# Patient Record
Sex: MALE | Race: BLACK OR AFRICAN AMERICAN | NOT HISPANIC OR LATINO | Employment: FULL TIME | URBAN - METROPOLITAN AREA
[De-identification: names, ages, dates, MRNs, and addresses within clinical notes are randomized per-mention and may not be internally consistent; named-entity substitution may affect disease eponyms.]

---

## 2023-04-12 ENCOUNTER — OFFICE VISIT (OUTPATIENT)
Dept: PRIMARY CARE | Facility: CLINIC | Age: 58
End: 2023-04-12
Payer: COMMERCIAL

## 2023-04-12 VITALS
OXYGEN SATURATION: 95 % | RESPIRATION RATE: 16 BRPM | BODY MASS INDEX: 31.83 KG/M2 | WEIGHT: 256 LBS | HEIGHT: 75 IN | DIASTOLIC BLOOD PRESSURE: 82 MMHG | SYSTOLIC BLOOD PRESSURE: 121 MMHG | HEART RATE: 86 BPM

## 2023-04-12 DIAGNOSIS — Z00.00 HEALTH MAINTENANCE EXAMINATION: Primary | ICD-10-CM

## 2023-04-12 LAB
ERYTHROCYTE DISTRIBUTION WIDTH (RATIO) BY AUTOMATED COUNT: 13.2 % (ref 11.5–14.5)
ERYTHROCYTE MEAN CORPUSCULAR HEMOGLOBIN CONCENTRATION (G/DL) BY AUTOMATED: 30 G/DL (ref 32–36)
ERYTHROCYTE MEAN CORPUSCULAR VOLUME (FL) BY AUTOMATED COUNT: 79 FL (ref 80–100)
ERYTHROCYTES (10*6/UL) IN BLOOD BY AUTOMATED COUNT: 4.93 X10E12/L (ref 4.5–5.9)
HEMATOCRIT (%) IN BLOOD BY AUTOMATED COUNT: 39 % (ref 41–52)
HEMOGLOBIN (G/DL) IN BLOOD: 11.7 G/DL (ref 13.5–17.5)
LEUKOCYTES (10*3/UL) IN BLOOD BY AUTOMATED COUNT: 5.8 X10E9/L (ref 4.4–11.3)
NRBC (PER 100 WBCS) BY AUTOMATED COUNT: 0 /100 WBC (ref 0–0)
PLATELETS (10*3/UL) IN BLOOD AUTOMATED COUNT: 278 X10E9/L (ref 150–450)

## 2023-04-12 PROCEDURE — 1036F TOBACCO NON-USER: CPT | Performed by: FAMILY MEDICINE

## 2023-04-12 PROCEDURE — 80053 COMPREHEN METABOLIC PANEL: CPT

## 2023-04-12 PROCEDURE — 80061 LIPID PANEL: CPT

## 2023-04-12 PROCEDURE — 85027 COMPLETE CBC AUTOMATED: CPT

## 2023-04-12 PROCEDURE — 99396 PREV VISIT EST AGE 40-64: CPT | Performed by: FAMILY MEDICINE

## 2023-04-12 PROCEDURE — 36415 COLL VENOUS BLD VENIPUNCTURE: CPT | Performed by: FAMILY MEDICINE

## 2023-04-12 ASSESSMENT — PAIN SCALES - GENERAL: PAINLEVEL: 0-NO PAIN

## 2023-04-12 ASSESSMENT — ENCOUNTER SYMPTOMS
HEADACHES: 0
UNEXPECTED WEIGHT CHANGE: 0
PALPITATIONS: 0
ABDOMINAL PAIN: 0
ARTHRALGIAS: 1
SHORTNESS OF BREATH: 0

## 2023-04-12 ASSESSMENT — COLUMBIA-SUICIDE SEVERITY RATING SCALE - C-SSRS
2. HAVE YOU ACTUALLY HAD ANY THOUGHTS OF KILLING YOURSELF?: NO
1. IN THE PAST MONTH, HAVE YOU WISHED YOU WERE DEAD OR WISHED YOU COULD GO TO SLEEP AND NOT WAKE UP?: NO
6. HAVE YOU EVER DONE ANYTHING, STARTED TO DO ANYTHING, OR PREPARED TO DO ANYTHING TO END YOUR LIFE?: NO

## 2023-04-12 ASSESSMENT — PATIENT HEALTH QUESTIONNAIRE - PHQ9
1. LITTLE INTEREST OR PLEASURE IN DOING THINGS: NOT AT ALL
SUM OF ALL RESPONSES TO PHQ9 QUESTIONS 1 AND 2: 0
2. FEELING DOWN, DEPRESSED OR HOPELESS: NOT AT ALL

## 2023-04-12 NOTE — ASSESSMENT & PLAN NOTE
Check labs to assess for any contraindications to surgery  Overall, based on current exam the patient is a good candidate for surgery  Agree with continuing to lose some weight to help with postsurgical healing and overall care   Will touch base regarding results once available    Addendum:   Labs reviewed  Mild anemia, recommend iron supplement, colonoscopy if not up to date  Patient is cleared for surgery

## 2023-04-12 NOTE — LETTER
"April 13, 2023     Jak Garcia  [address] [facility] [address]    Patient: Jak Garcia   YOB: 1965   Date of Visit: 4/12/2023       Dear Dr. Jak Garcia:    Thank you for referring Jak Garcia to me for evaluation. Below are my notes for this consultation.  If you have questions, please do not hesitate to call me. I look forward to following your patient along with you.       Sincerely,     Daina Hatfield MD      CC: No Recipients  ______________________________________________________________________________________    Subjective  Patient ID: Jak Garcia is a 58 y.o. male who presents for Pre-op Exam (Left knee replacement ..).    HPI   Having left knee replacement in June of this year.  Has had issues with knee for awhile and has had arthroscopic surgery in the past.      Has not seen PCP in recent past.  No previous medical history.  Nonsmoker, not a drinker.  Trying to lose weight and strength train at the gym.    Has had general anesthesia several times in the past without any issue.  Able to walk flight of stairs without any problem.  No pulmonary problems.  No personal or family hx of blood clots or clotting disorder.      Review of Systems   Constitutional:  Negative for unexpected weight change.   Eyes:  Negative for visual disturbance.   Respiratory:  Negative for shortness of breath.    Cardiovascular:  Negative for chest pain, palpitations and leg swelling.   Gastrointestinal:  Negative for abdominal pain.   Musculoskeletal:  Positive for arthralgias.   Neurological:  Negative for headaches.       Objective  /82 (BP Location: Left arm, Patient Position: Sitting, BP Cuff Size: Large adult)   Pulse 86   Resp 16   Ht 1.905 m (6' 3\")   Wt 116 kg (256 lb)   SpO2 95%   BMI 32.00 kg/m²     Physical Exam  Constitutional:       Appearance: He is normal weight.   Eyes:      Extraocular Movements: Extraocular movements intact.      Pupils: Pupils are equal, round, and reactive to light. "   Cardiovascular:      Rate and Rhythm: Normal rate and regular rhythm.      Heart sounds: No murmur heard.     No gallop.   Pulmonary:      Effort: Pulmonary effort is normal.      Breath sounds: Normal breath sounds.   Musculoskeletal:      Cervical back: Normal range of motion.   Neurological:      General: No focal deficit present.      Mental Status: He is alert and oriented to person, place, and time.         Assessment/Plan  Problem List Items Addressed This Visit          Other    Health maintenance examination - Primary     Check labs to assess for any contraindications to surgery  Overall, based on current exam the patient is a good candidate for surgery  Agree with continuing to lose some weight to help with postsurgical healing and overall care   Will touch base regarding results once available         Relevant Orders    CBC    Comprehensive Metabolic Panel    Lipid Panel

## 2023-04-12 NOTE — PROGRESS NOTES
"Subjective   Patient ID: Jak Garcia is a 58 y.o. male who presents for Pre-op Exam (Left knee replacement ..).    HPI   Having left knee replacement in June of this year.  Has had issues with knee for awhile and has had arthroscopic surgery in the past.      Has not seen PCP in recent past.  No previous medical history.  Nonsmoker, not a drinker.  Trying to lose weight and strength train at the gym.    Has had general anesthesia several times in the past without any issue.  Able to walk flight of stairs without any problem.  No pulmonary problems.  No personal or family hx of blood clots or clotting disorder.      Review of Systems   Constitutional:  Negative for unexpected weight change.   Eyes:  Negative for visual disturbance.   Respiratory:  Negative for shortness of breath.    Cardiovascular:  Negative for chest pain, palpitations and leg swelling.   Gastrointestinal:  Negative for abdominal pain.   Musculoskeletal:  Positive for arthralgias.   Neurological:  Negative for headaches.       Objective   /82 (BP Location: Left arm, Patient Position: Sitting, BP Cuff Size: Large adult)   Pulse 86   Resp 16   Ht 1.905 m (6' 3\")   Wt 116 kg (256 lb)   SpO2 95%   BMI 32.00 kg/m²     Physical Exam  Constitutional:       Appearance: He is normal weight.   Eyes:      Extraocular Movements: Extraocular movements intact.      Pupils: Pupils are equal, round, and reactive to light.   Cardiovascular:      Rate and Rhythm: Normal rate and regular rhythm.      Heart sounds: No murmur heard.     No gallop.   Pulmonary:      Effort: Pulmonary effort is normal.      Breath sounds: Normal breath sounds.   Musculoskeletal:      Cervical back: Normal range of motion.   Neurological:      General: No focal deficit present.      Mental Status: He is alert and oriented to person, place, and time.         Assessment/Plan   Problem List Items Addressed This Visit          Other    Health maintenance examination - Primary     " Check labs to assess for any contraindications to surgery  Overall, based on current exam the patient is a good candidate for surgery  Agree with continuing to lose some weight to help with postsurgical healing and overall care   Will touch base regarding results once available         Relevant Orders    CBC    Comprehensive Metabolic Panel    Lipid Panel

## 2023-04-13 LAB
ALANINE AMINOTRANSFERASE (SGPT) (U/L) IN SER/PLAS: 20 U/L (ref 10–52)
ALBUMIN (G/DL) IN SER/PLAS: 4.5 G/DL (ref 3.4–5)
ALKALINE PHOSPHATASE (U/L) IN SER/PLAS: 72 U/L (ref 33–120)
ANION GAP IN SER/PLAS: 12 MMOL/L (ref 10–20)
ASPARTATE AMINOTRANSFERASE (SGOT) (U/L) IN SER/PLAS: 21 U/L (ref 9–39)
BILIRUBIN TOTAL (MG/DL) IN SER/PLAS: 0.5 MG/DL (ref 0–1.2)
CALCIUM (MG/DL) IN SER/PLAS: 9.2 MG/DL (ref 8.6–10.6)
CARBON DIOXIDE, TOTAL (MMOL/L) IN SER/PLAS: 27 MMOL/L (ref 21–32)
CHLORIDE (MMOL/L) IN SER/PLAS: 104 MMOL/L (ref 98–107)
CHOLESTEROL (MG/DL) IN SER/PLAS: 176 MG/DL (ref 0–199)
CHOLESTEROL IN HDL (MG/DL) IN SER/PLAS: 50.9 MG/DL
CHOLESTEROL/HDL RATIO: 3.5
CREATININE (MG/DL) IN SER/PLAS: 0.99 MG/DL (ref 0.5–1.3)
GFR MALE: 89 ML/MIN/1.73M2
GLUCOSE (MG/DL) IN SER/PLAS: 109 MG/DL (ref 74–99)
LDL: 107 MG/DL (ref 0–99)
POTASSIUM (MMOL/L) IN SER/PLAS: 4 MMOL/L (ref 3.5–5.3)
PROTEIN TOTAL: 7.2 G/DL (ref 6.4–8.2)
SODIUM (MMOL/L) IN SER/PLAS: 139 MMOL/L (ref 136–145)
TRIGLYCERIDE (MG/DL) IN SER/PLAS: 92 MG/DL (ref 0–149)
UREA NITROGEN (MG/DL) IN SER/PLAS: 12 MG/DL (ref 6–23)
VLDL: 18 MG/DL (ref 0–40)

## 2023-05-02 ENCOUNTER — DOCUMENTATION (OUTPATIENT)
Dept: PRIMARY CARE | Facility: CLINIC | Age: 58
End: 2023-05-02
Payer: COMMERCIAL

## 2023-06-07 ENCOUNTER — HOSPITAL ENCOUNTER (OUTPATIENT)
Dept: DATA CONVERSION | Facility: HOSPITAL | Age: 58
End: 2023-06-08
Attending: ORTHOPAEDIC SURGERY | Admitting: ORTHOPAEDIC SURGERY
Payer: COMMERCIAL

## 2023-06-07 DIAGNOSIS — M25.762 OSTEOPHYTE, LEFT KNEE: ICD-10-CM

## 2023-06-07 DIAGNOSIS — M17.12 UNILATERAL PRIMARY OSTEOARTHRITIS, LEFT KNEE: ICD-10-CM

## 2023-06-08 LAB
ANION GAP IN SER/PLAS: 13 MMOL/L (ref 10–20)
BASOPHILS (10*3/UL) IN BLOOD BY AUTOMATED COUNT: 0.01 X10E9/L (ref 0–0.1)
BASOPHILS/100 LEUKOCYTES IN BLOOD BY AUTOMATED COUNT: 0.1 % (ref 0–2)
CALCIUM (MG/DL) IN SER/PLAS: 8.5 MG/DL (ref 8.6–10.3)
CARBON DIOXIDE, TOTAL (MMOL/L) IN SER/PLAS: 22 MMOL/L (ref 21–32)
CHLORIDE (MMOL/L) IN SER/PLAS: 103 MMOL/L (ref 98–107)
CREATININE (MG/DL) IN SER/PLAS: 1.15 MG/DL (ref 0.5–1.3)
EOSINOPHILS (10*3/UL) IN BLOOD BY AUTOMATED COUNT: 0 X10E9/L (ref 0–0.7)
EOSINOPHILS/100 LEUKOCYTES IN BLOOD BY AUTOMATED COUNT: 0 % (ref 0–6)
ERYTHROCYTE DISTRIBUTION WIDTH (RATIO) BY AUTOMATED COUNT: 13 % (ref 11.5–14.5)
ERYTHROCYTE MEAN CORPUSCULAR HEMOGLOBIN CONCENTRATION (G/DL) BY AUTOMATED: 30.2 G/DL (ref 32–36)
ERYTHROCYTE MEAN CORPUSCULAR VOLUME (FL) BY AUTOMATED COUNT: 80 FL (ref 80–100)
ERYTHROCYTES (10*6/UL) IN BLOOD BY AUTOMATED COUNT: 4.43 X10E12/L (ref 4.5–5.9)
GFR MALE: 75 ML/MIN/1.73M2
GLUCOSE (MG/DL) IN SER/PLAS: 161 MG/DL (ref 74–99)
HEMATOCRIT (%) IN BLOOD BY AUTOMATED COUNT: 35.4 % (ref 41–52)
HEMOGLOBIN (G/DL) IN BLOOD: 10.7 G/DL (ref 13.5–17.5)
IMMATURE GRANULOCYTES/100 LEUKOCYTES IN BLOOD BY AUTOMATED COUNT: 0.3 % (ref 0–0.9)
LEUKOCYTES (10*3/UL) IN BLOOD BY AUTOMATED COUNT: 10.3 X10E9/L (ref 4.4–11.3)
LYMPHOCYTES (10*3/UL) IN BLOOD BY AUTOMATED COUNT: 1.14 X10E9/L (ref 1.2–4.8)
LYMPHOCYTES/100 LEUKOCYTES IN BLOOD BY AUTOMATED COUNT: 11 % (ref 13–44)
MONOCYTES (10*3/UL) IN BLOOD BY AUTOMATED COUNT: 0.7 X10E9/L (ref 0.1–1)
MONOCYTES/100 LEUKOCYTES IN BLOOD BY AUTOMATED COUNT: 6.8 % (ref 2–10)
NEUTROPHILS (10*3/UL) IN BLOOD BY AUTOMATED COUNT: 8.46 X10E9/L (ref 1.2–7.7)
NEUTROPHILS/100 LEUKOCYTES IN BLOOD BY AUTOMATED COUNT: 81.8 % (ref 40–80)
NRBC (PER 100 WBCS) BY AUTOMATED COUNT: 0 /100 WBC (ref 0–0)
PLATELETS (10*3/UL) IN BLOOD AUTOMATED COUNT: 276 X10E9/L (ref 150–450)
POTASSIUM (MMOL/L) IN SER/PLAS: 4.4 MMOL/L (ref 3.5–5.3)
SODIUM (MMOL/L) IN SER/PLAS: 134 MMOL/L (ref 136–145)
UREA NITROGEN (MG/DL) IN SER/PLAS: 16 MG/DL (ref 6–23)

## 2023-06-09 ENCOUNTER — EXTERNAL HOSPITAL ADMISSION (OUTPATIENT)
Dept: PRIMARY CARE | Facility: CLINIC | Age: 58
End: 2023-06-09
Payer: COMMERCIAL

## 2023-06-09 LAB
ANION GAP IN SER/PLAS: NORMAL
BASOPHILS (10*3/UL) IN BLOOD BY AUTOMATED COUNT: NORMAL
BASOPHILS/100 LEUKOCYTES IN BLOOD BY AUTOMATED COUNT: NORMAL
CALCIUM (MG/DL) IN SER/PLAS: NORMAL
CARBON DIOXIDE, TOTAL (MMOL/L) IN SER/PLAS: NORMAL
CHLORIDE (MMOL/L) IN SER/PLAS: NORMAL
CREATININE (MG/DL) IN SER/PLAS: NORMAL
EOSINOPHILS (10*3/UL) IN BLOOD BY AUTOMATED COUNT: NORMAL
EOSINOPHILS/100 LEUKOCYTES IN BLOOD BY AUTOMATED COUNT: NORMAL
ERYTHROCYTE DISTRIBUTION WIDTH (RATIO) BY AUTOMATED COUNT: NORMAL
ERYTHROCYTE MEAN CORPUSCULAR HEMOGLOBIN CONCENTRATION (G/DL) BY AUTOMATED: NORMAL
ERYTHROCYTE MEAN CORPUSCULAR VOLUME (FL) BY AUTOMATED COUNT: NORMAL
ERYTHROCYTES (10*6/UL) IN BLOOD BY AUTOMATED COUNT: NORMAL
GFR FEMALE: NORMAL
GFR MALE: NORMAL
GLUCOSE (MG/DL) IN SER/PLAS: NORMAL
HEMATOCRIT (%) IN BLOOD BY AUTOMATED COUNT: NORMAL
HEMOGLOBIN (G/DL) IN BLOOD: NORMAL
IMMATURE GRANULOCYTES/100 LEUKOCYTES IN BLOOD BY AUTOMATED COUNT: NORMAL
LEUKOCYTES (10*3/UL) IN BLOOD BY AUTOMATED COUNT: NORMAL
LYMPHOCYTES (10*3/UL) IN BLOOD BY AUTOMATED COUNT: NORMAL
LYMPHOCYTES/100 LEUKOCYTES IN BLOOD BY AUTOMATED COUNT: NORMAL
MANUAL DIFFERENTIAL Y/N: NORMAL
MONOCYTES (10*3/UL) IN BLOOD BY AUTOMATED COUNT: NORMAL
MONOCYTES/100 LEUKOCYTES IN BLOOD BY AUTOMATED COUNT: NORMAL
NEUTROPHILS (10*3/UL) IN BLOOD BY AUTOMATED COUNT: NORMAL
NEUTROPHILS/100 LEUKOCYTES IN BLOOD BY AUTOMATED COUNT: NORMAL
NRBC (PER 100 WBCS) BY AUTOMATED COUNT: NORMAL
PLATELETS (10*3/UL) IN BLOOD AUTOMATED COUNT: NORMAL
POTASSIUM (MMOL/L) IN SER/PLAS: NORMAL
SODIUM (MMOL/L) IN SER/PLAS: NORMAL
UREA NITROGEN (MG/DL) IN SER/PLAS: NORMAL

## 2023-07-20 LAB
COMPLETE PATHOLOGY REPORT: NORMAL
CONVERTED CLINICAL DIAGNOSIS-HISTORY: NORMAL
CONVERTED FINAL DIAGNOSIS: NORMAL
CONVERTED FINAL REPORT PDF LINK TO COPY AND PASTE: NORMAL
CONVERTED GROSS DESCRIPTION: NORMAL

## 2023-08-28 ENCOUNTER — HOSPITAL ENCOUNTER (OUTPATIENT)
Dept: DATA CONVERSION | Facility: HOSPITAL | Age: 58
End: 2023-08-28
Attending: ORTHOPAEDIC SURGERY | Admitting: ORTHOPAEDIC SURGERY
Payer: COMMERCIAL

## 2023-08-28 DIAGNOSIS — M24.662 ANKYLOSIS, LEFT KNEE: ICD-10-CM

## 2023-09-07 VITALS — BODY MASS INDEX: 31.8 KG/M2 | HEIGHT: 75 IN | WEIGHT: 255.73 LBS

## 2023-09-30 NOTE — DISCHARGE SUMMARY
Send Summary:   Discharge Summary Providers:   Provider Role Provider Name   · Attending César Siddiqi   · Referring César Siddiqi   · Consulting Jhonatan Combs       Note Recipients: aDina Hatfield MD - [id number] []         Discharge:    Summary:   Admission Date: .07-Jun-2023 10:37:00   Discharge Date: 08-Jun-2023   Attending Physician at Discharge: César Siddiqi   Admission Reason: Osteoarthritis of left knee   Final Discharge Diagnoses: 1.Osteoarthritis    2.Obesity  with BMI 32   Procedures: Date: 07-Jun-2023 15:01:00  Procedure Name: Left total Knee Arthroplasty   Condition at Discharge: Satisfactory   Disposition at Discharge: Home Health Care - New   Vital Signs:        T   P  R  BP   MAP  SpO2   Value  37.1  64  18  122/59   80  98%  Date/Time 6/8 7:58 6/8 7:58 6/8 7:58 6/8 7:58  6/8 7:58 6/8 7:58  Range  (35.7C - 37.2C )  (56 - 80 )  (18 - 18 )  (122 - 186 )/ (59 - 99 )  (80 - 96 )  (95% - 99% )  Highest temp of 37.2 C was recorded at 6/7 22:04    Date:            Weight/Scale Type:  Height:   07-Jun-2023 17:53  116  kg         190.5  cm  Hospital Course:    Patient is a 58 year old male with severe osteoarthritis of left knee.  On 6/7/23, patient underwent uncomplicated left total knee replacement by Dr Siddiqi.  Spinal  anesthesia was utilized and minimal blood loss noted.  Patient received one dose of IV antibiotics preoperatively and 3 additional doses were administered post operatively.  Hospitalist service was placed on consult to help assist with post op medical  management.  For DVT prophylaxis, patient was ordered xarelto 10mg daily and bilateral SCDs.   Physical and occupational therapy were initiated with full weight bearing status.  Patien progress well with therapy.  His hemoglobin level was stable at 10.7 and he remained afebrile.  On POD #1, patient was medically stable and safe for discharge home with [facility] followup.  At time of discharge, patient was ambulating 150 feet with use  of wheeled walker  and stand by assistance.  his left knee range of motion was 5 to 80 degrees.  Home going script of xarelto 10mg daily for 12 days was provided to patient.  In addition, bilateral amelia hose compression stockings were given along with instructions  to wear at home for 4 to 6 weeks.  For pain management, oxycodone and tramadol scripts were provided.  Anticipate need for extended use of narcotic medications as well as higher MED requirements based on usual post operative incisional pain following  total joint replacement surgery.  patient will followup in office with Dr. Siddiqi in 4 weeks.          Discharge Information:    and Continuing Care:   Lab Results - Pending:    Surgical Pathology Drawn at 07-Jun-2023 13:45:00  Radiology Results - Pending: None   Discharge Instructions:    Activity:           activity as tolerated   with walker.          May shower..  with initial bandage in place          May not drive.            Weight-bearing Instructions: weight-bearing as tolerated.      Nutrition/Diet:           resume normal diet          Encourage Fluids:   Increase your water and fiber intake to help prevent constipation    Wound Care:           Wound Site:   left knee          Change Dressing:   remove dressing in one week          Cover With:   no dressing, leave open to air,  if no drainage noted    Additional Orders:           Additional Instructions:   .  Continue to apply ice to incision 4 to 5 times a day for 20 minutes    Take over the counter stool softener while you are taking pain medications.  Stop stool softener if you experience diarrhea.    Do NOT take any non steroid anti-inflammatory medications (NSAIDS) such as ibuprofen, aleve, or naproxen.    Wear bilateral amelia hose compression stockings to lower legs for next 4 to 6 weeks      Home Care Certification:           Home Care Agency:    Home [facility] [phone]          Skilled Disciplines Ordered:   RN/LPN,  PT    Home Care Services:           Home  Care Skilled Service:   Rehab (PT/OT/SP eval and treat),  wound care    Follow Up Appointments:    Follow-Up Appointment 01:           Physician/Dept/Service:   Dr Siddiqi          Call to Schedule in:   4 weeks          Phone Number:   [phone]    Discharge Medications: Home Medication   Tylenol 500 mg oral tablet - 2 tab(s) orally every 8 hours  rivaroxaban 10 mg oral tablet - 1 tab(s) orally once a day x 12 days   Colace 100 mg oral capsule - 1 cap(s) orally once a day  traMADol 50 mg oral tablet - 1 tab(s) orally every 4 to 6 hours x 7 days as needed for moderate pain G89.18  oxyCODONE 5 mg oral tablet - 1 tab(s) orally every 4 hours x 7 days as needed for severe incisional pain G89.18  ZyrTEC 10 mg oral tablet - 1 cap(s) oral pn     PRN Medication   MiraLax oral powder for reconstitution - orally 2 times a day, As Needed for constipation  take dose if you do NOT have bowel movement by Sunday June 11th     DNR Status:   ·  Code Status Code Status order at time of discharge: Full Code       Electronic Signatures:  Karen Ba (APRN-CNS)  (Signed 09-Jun-2023 11:14)   Authored: Send Summary, Summary Content, Ongoing Care,  DNR Status, Note Completion      Last Updated: 09-Jun-2023 11:14 by Karen Ba ([facility])

## 2023-09-30 NOTE — PROGRESS NOTES
Consult Type: subsequent visit/care     Service: Medicine     Subjective Data:   ZAC FARR is a 58 year old Male who is Hospital Day # 2 and POD #1 for L TKA.    Additional Information:    Patient seen and examined, sitting in chair at bedside.  Doing well postoperatively.  Left knee pain controlled with medication.  Vital signs and labs stable.  Blood  glucose on BMP elevated while inpatient, was normal on labs from PCP visit, continued monitoring by PCP as outpatient.  Looking forward to anticipated discharge today.    Objective Data:     Objective Information:      T   P  R  BP   MAP  SpO2   Value  37.1  64  18  122/59   80  98%  Date/Time 6/8 7:58 6/8 7:58 6/8 7:58 6/8 7:58  6/8 7:58 6/8 7:58  Range  (35.7C - 37.2C )  (56 - 80 )  (18 - 18 )  (122 - 186 )/ (59 - 99 )  (80 - 96 )  (95% - 99% )  Highest temp of 37.2 C was recorded at 6/7 22:04      Pain reported at 6/8 8:11: 5 = Moderate    ---- Intake and Output  -----  Mn/Dy/Year Time  Intake   Output  Net  Jun 8, 2023 6:00 am  520   350  170  Jun 7, 2023 10:00 pm  100   375  -275    The Intake and Output Totals for the last 24 hours are:      Intake   Output  Net      620   725  -105    Physical Exam by System:    Constitutional: Awake and alert, oriented x3, pleasant  and cooperative   Eyes: PERRL, EOMI   ENMT: mucous membranes moist, no apparent injury,  no lesions seen   Head/Neck: No JVD, trachea midline   Respiratory/Thorax: Respirations quiet, even, unlabored.   Lungs CTA   Cardiovascular: Normal rate, regular rhythm, no murmur.   Palpable distal pulses.   Gastrointestinal: Abdomen soft, nondistended, nontender,  audible bowel sounds   Musculoskeletal: ROM intact, no joint swelling, normal  strength with left leg deferred.  Left knee with Aquacel dressing in place, mild localized edema and more   Extremities: No cyanosis or peripheral edema   Neurological: Alert and oriented x3, speech clear,  no focal neurological deficits   Psychological:  Appropriate mood and behavior, pleasant   Skin: Warm, dry.  No obvious lesions or rashes.     Medication:    Medications:          Continuous Medications       --------------------------------    1. Lactated Ringers Infusion:  1000  mL  IntraVenous  <Continuous>         Scheduled Medications       --------------------------------    1. Acetaminophen:  975  mg  Oral  Every 8 Hours    2. ceFAZolin 2 gram/ D5W 100 mL Premix IVPB:  100  mL  IntraVenous Piggyback  Once    3. Docusate:  100  mg  Oral  2 Times a Day    4. Polyethylene Glycol:  17  gram(s)  Oral  2 Times a Day    5. Rivaroxaban:  10  mg  Oral  Daily    6. traMADol:  50  mg  Oral  Every 6 Hours         PRN Medications       --------------------------------    1. Dyclonine Lozenge:  1  lozenge(s)  Oral  Every 4 Hours    2. HYDROmorphone Injectable:  0.4  mg  IntraVenous Push  Every 2 Hours    3. Ondansetron Injectable:  4  mg  IntraVenous Push  Every 6 Hours    4. oxyCODONE Immediate Release:  5  mg  Oral  Every 4 Hours    5. oxyCODONE Immediate Release:  10  mg  Oral  Every 4 Hours        Recent Lab Results:    Results:    CBC: 6/8/2023 06:22              \     Hgb     /                              \     10.7 L    /  WBC  ----------------  Plt               10.3       ----------------    276              /     Hct     \                              /     35.4 L    \            RBC: 4.43 L    MCV: 80     Neutrophil %: 81.8      BMP: 6/8/2023 06:22  NA+        Cl-     BUN  /                         134 L   103    16  /  --------------------------------  Glucose                ---------------------------  161 H    K+     HCO3-   Creat \                         4.4  22    1.15  \  Calcium : 8.5 L    Anion Gap : 13      Assessment and Plan:   Code Status:  ·  Code Status Full Code     Assessment:    #S/p left total knee replacement POD 1  -Per orthopedic surgery  -Work with PT/OT  -Postoperative pain controleffective  -Incentive spirometer minimum 10  times hourly while awake  -Bowel regimen  -Tolerating diet  -Vital signs and labs stable    #DVT prophylaxis  -Pharmacologic prophylaxis per orthopedic surgery, Xarelto 10 mg once daily x12 days to start 6/8/2023  -SCDs while in bed  -Increase mobility    Attestation:   Note Completion:  I am a:  Advanced Practice Provider   Attending Only - Shared Visit with Advanced Practice Provider This is a shared visit.  I have reviewed the Advanced Practice Provider?s encounter note, approve the Advanced Practice Provider?s documentation,  and provide the following additional information from my personal encounter.    Comments/ Additional Findings    Patient seen and care provided in collaboration with the Nurse Practitioner.  I have spent more than half of the total time, of this shared visit,  reviewing and coordinating the patients condition and medical treatment plan.  Reviewed and agree with the documentation by the nurse practitioner in addition to the following documentation.          Electronic Signatures:  Shirin Stuart (APRN-CNP)  (Signed 08-Jun-2023 08:42)   Authored: Service, Subjective Data, Objective Data, Assessment  and Plan, Note Completion  Etienne Ellington ()  (Signed 10-Александр-2023 10:48)   Authored: Note Completion   Co-Signer: Service, Subjective Data, Objective Data, Assessment and Plan, Note Completion      Last Updated: 10-Александр-2023 10:48 by Etienne Ellington ()

## 2023-09-30 NOTE — H&P
History & Physical Reviewed:   I have reviewed the History and Physical dated:  25-May-2023   History and Physical reviewed and relevant findings noted. Patient examined to review pertinent physical  findings.: No significant changes   Home Medications Reviewed: no changes noted   Allergies Reviewed: no changes noted       ERAS (Enhanced Recovery After Surgery):  ·  ERAS Patient: no     Consent:   COVID-19 Consent:  ·  COVID-19 Risk Consent Surgeon has reviewed key risks related to the risk of dyan COVID-19 and if they contract COVID-19 what the risks are.       Electronic Signatures:  César Siddiqi)  (Signed 07-Jun-2023 12:24)   Authored: History & Physical Reviewed, ERAS, Consent,  Note Completion      Last Updated: 07-Jun-2023 12:24 by César Siddiqi)

## 2023-09-30 NOTE — H&P
History & Physical Reviewed:   I have reviewed the History and Physical dated:  25-Aug-2023   History and Physical reviewed and relevant findings noted. Patient examined to review pertinent physical  findings.: No significant changes   Home Medications Reviewed: no changes noted   Allergies Reviewed: no changes noted       ERAS (Enhanced Recovery After Surgery):  ·  ERAS Patient: no     Consent:   COVID-19 Consent:  ·  COVID-19 Risk Consent Surgeon has reviewed key risks related to the risk of dyan COVID-19 and if they contract COVID-19 what the risks are.       Electronic Signatures:  César Siddiqi)  (Signed 28-Aug-2023 07:46)   Authored: History & Physical Reviewed, ERAS, Consent,  Note Completion      Last Updated: 28-Aug-2023 07:46 by César Siddiqi)

## 2023-09-30 NOTE — PROGRESS NOTES
Service: Orthopaedics     Subjective Data:   ZAC FARR is a 58 year old Male who is  POD #1 for L TKA.     Patient c/o moderate amount of incisional pain  Denies any c/o nausea tolerating regular diet well.    Objective Data:     Objective Information:        T   P  R  BP   MAP  SpO2   Value  37.1  80  18  123/74   83  98%  Date/Time 6/8 3:55 6/8 3:55 6/8 3:55 6/8 3:55  6/8 3:55 6/8 3:55  Range  (35.7C - 37.2C )  (56 - 80 )  (18 - 18 )  (123 - 186 )/ (72 - 99 )  (83 - 96 )  (95% - 99% )  Highest temp of 37.2 C was recorded at 6/7 22:04    ---Intake---   IV Fluids      Infusion: 100 mL      Infusion: 100 mL      Infusion: 100 mL      Infusion: 520 mL      Infusion: 520 mL      Infusion: 520 mL    ---Output---  Urine      Voided (mL): 700 mL  Blood      Estimated Blood Loss: 25 mL    Physical Exam by System     Extremities: Aquacel dressing D&I to left knee incision.   Minimal amount of edema present to left knee.  neurovascular status WNL LLE.     Recent Lab Results     Results:    CBC: 6/8/2023 06:22              \     Hgb     /                              \     10.7 L    /  WBC  ----------------  Plt               10.3       ----------------    276              /     Hct     \                              /     35.4 L    \            RBC: 4.43 L    MCV: 80     Neutrophil %: 81.8      BMP: 6/8/2023 06:22  NA+        Cl-     BUN  /                         134 L   103    16  /  --------------------------------  Glucose                ---------------------------  161 H    K+     HCO3-   Creat \                         4.4  22    1.15  \  Calcium : 8.5 L    Anion Gap : 13      Radiology Results     Results:    Xray Knee 1 or 2 View [Jun 7 2023  6:03PM]  FINDINGS/   IMPRESSION:   The knee arthroplasty is in anatomic alignment. There is no   periprosthetic fracture. There is expected postsurgical subcutaneous    emphysema and pneumarthrosis.           Assessment and Plan:   Code Status   ·  Code  Status Full Code     Assessment:    POD #1  Patient afebrile  Hemoglobin level from this am stable  Encouraged use of IS Q1 WA      Impression 1: S/P TOTAL KNEE REPLACEMENT   Plan for Impression 1: Continue therapy PT/OT with  100% WBS.  Plan discharge home with [facility] later today if patient safe and medically stable.   Impression 2: DVT PROPHYLAXIS   Plan for Impression 2: Begin xarelto 10mg daily this  am.  Bilateral sCDS to be worn while patient in bed.  Encouraged ankle pumps Q1 WA.  Increase patient's activity as tolerated.   Impression 3: INCISIONAL PAIN   Plan for Impression 3: Continue routine tylenol,  tramadol & toradol with ice therapy.  Encouraged use of PRN oxycodone as peripheral block wears off and with increased activity.     Electronic Signatures for Addendum Section:   César Siddiqi) (Signed Addendum 08-Jun-2023 10:22)   Patient seen and examined, agree with note     Electronic Signatures:  Karen Ba (APRN-CNS)  (Signed 08-Jun-2023 07:36)   Authored: Service, Subjective Data, Objective Data, Assessment  and Plan, Note Completion      Last Updated: 08-Jun-2023 10:22 by César Siddiqi)

## 2023-10-01 NOTE — OP NOTE
Post Operative Note:     PreOp Diagnosis: L KNEE ARTHROFIBROSIS   Post-Procedure Diagnosis: SAME   Procedure: L KNEE MANIPULATION   Surgeon: JOLEEN   Resident/Fellow/Other Assistant: CHIRAG   Anesthesia: GEN   Estimated Blood Loss (mL): none   Specimen: no   Findings: SEE DX     Attestation:   Note Completion:  Attending Attestation I performed the procedure without a resident         Electronic Signatures:  César Siddiqi)  (Signed 28-Aug-2023 07:47)   Authored: Post Operative Note, Note Completion      Last Updated: 28-Aug-2023 07:47 by César Siddiqi)

## 2023-10-02 NOTE — OP NOTE
Post Operative Note:     PreOp Diagnosis: L KNEE DJD   Post-Procedure Diagnosis: SAME   Procedure: L TKA   Surgeon: JOLEEN   Resident/Fellow/Other Assistant: LISA/NASREEN   Anesthesia: SPINAL   Estimated Blood Loss (mL): 25   Specimen: yes. BONE   Findings: SEE DX     Attestation:   Note Completion:  Attending Attestation I performed the procedure without a resident         Electronic Signatures:  César Siddiqi)  (Signed 07-Jun-2023 15:03)   Authored: Post Operative Note, Note Completion      Last Updated: 07-Jun-2023 15:03 by César Siddiqi)

## 2024-03-06 NOTE — CONSULTS
Service:   Service: Medicine     Consult:  Consult requested by (Attending Name): César Siddiqi   Reason: Medical management following left total knee  arthroplasty     History of Present Illness:   Admission Reason: Left total knee replacement   HPI:    ZAC FARR is a 58 year old Male with history of OA and seasonal allergies s/p left total knee replacement day of surgery.  The left knee has become increasingly  more painful over the years limiting his daily activities.  He is active, works out at the gym several times weekly.  PCP is Dr. Hatfield.    Patient seen and examined at bedside.  Awake and alert, starting to have pain to left knee.  Tolerating diet, no nausea.  Vital signs stable.  Monitor vital signs overnight, check labs in the a.m.    Past Medical/Surgical History:        Medical History:   OA (osteoarthritis):        Surg History:   History of arthroscopy of hip:    History of lumbar discectomy:    History of hernia repair:     Review Family/Social History and ROS:   Family History:  Diabetes: yes   Hypertension: yes     Social History:    Smoking Status: never smoker   Alcohol Use: denies   Drug Use: denies   Occupation: [employer] K[employer]     Constitutional: NEGATIVE: Fever, Chills, Anorexia,  Weight Loss, Malaise     Eyes: NEGATIVE: Blurry Vision, Drainage, Diploplia,  Redness, Vision Loss/ Change     ENMT: NEGATIVE: Nasal Discharge, Nasal Congestion,  Ear Pain, Mouth Pain, Throat Pain     Respiratory: NEGATIVE: Dry Cough, Productive Cough,  Hemoptysis, Wheezing, Shortness of Breath     Cardiac: NEGATIVE: Chest Pain, Dyspnea on Exertion,  Orthopnea, Palpitations, Syncope     Gastrointestinal: NEGATIVE: Nausea, Vomiting, Diarrhea,  Constipation, Abdominal Pain     Genitourinary: NEGATIVE: Discharge, Dysuria, Flank  Pain, Frequency, Hematuria     Musculoskeletal: POSITIVE: Decreased ROM, Pain, Stiffness ; COMMENTS: Left knee     Neurological: NEGATIVE: Dizziness, Confusion, Headache,  Seizures,  Syncope     Psychiatric: NEGATIVE: Mood Changes, Anxiety, Hallucinations,  Sleep Changes, Suicidal Ideas     Skin: NEGATIVE: Mass, Pain, Pruritus, Rash, Ulcer              Allergies:  ·  No Known Allergies :     Objective:     Objective Information:        T   P  R  BP   MAP  SpO2   Value  35.7  56     128/77   96  97%  Date/Time 6/7 16:58 6/7 16:58   6/7 16:58  6/7 16:58 6/7 16:58  Range  (35.7C - 35.7C )  (56 - 56 )    (128 - 128 )/ (77 - 77 )  (96 - 96 )  (97% - 97% )    Physical Exam by System:    Constitutional: Awake and alert, oriented x3, pleasant  and cooperative   Eyes: PERRL, EOMI   ENMT: mucous membranes moist, no apparent injury,  no lesions seen   Head/Neck: No JVD, trachea midline   Respiratory/Thorax: Respirations quiet, even, unlabored.   Lungs CTA   Cardiovascular: Normal rate, regular rhythm, no murmur.   Palpable distal pulses.   Gastrointestinal: Abdomen soft, nondistended, nontender,  failtly audible bowel sounds   Musculoskeletal: ROM intact, no joint swelling, normal  strength with left leg deferred.  Left leg with dressing, wrapped, and ice pack in place   Extremities: No cyanosis or peripheral edema   Neurological: Alert and oriented x3, speech clear,  no focal neurological deficits   Psychological: Appropriate mood and behavior, pleasant   Skin: Warm, dry.  No obvious lesions or rashes.     Medications:    Medications:          Continuous Medications       --------------------------------    1. Lactated Ringers Infusion:  1000  mL  IntraVenous  <Continuous>         Scheduled Medications       --------------------------------    1. Acetaminophen:  975  mg  Oral  Every 8 Hours    2. ceFAZolin 2 gram/ D5W 100 mL Premix IVPB:  100  mL  IntraVenous Piggyback  Once    3. ceFAZolin 3 gram/ D5W 100 mL.:  100  mL  IntraVenous Piggyback  Every 6 Hours    4. Docusate:  100  mg  Oral  2 Times a Day    5. Ketorolac Injectable:  15  mg  IntraVenous Push  Every 8 Hours    6. Polyethylene Glycol:  17   gram(s)  Oral  2 Times a Day    7. traMADol:  50  mg  Oral  Every 6 Hours         PRN Medications       --------------------------------    1. Dyclonine Lozenge:  1  lozenge(s)  Oral  Every 4 Hours    2. HYDROmorphone Injectable:  0.4  mg  IntraVenous Push  Every 2 Hours    3. Ondansetron Injectable:  4  mg  IntraVenous Push  Every 6 Hours    4. oxyCODONE Immediate Release:  5  mg  Oral  Every 4 Hours    5. oxyCODONE Immediate Release:  10  mg  Oral  Every 4 Hours          Assessment:    #S/p left total knee replacement POD 0  -Per orthopedic surgery  -Work with PT/OT  -Postoperative pain control  -Incentive spirometer minimum 10 times hourly while awake  -Bowel regimen  -Advance diet as tolerated  -Monitor vital signs, check labs in the a.m.    #DVT prophylaxis  -Pharmacologic prophylaxis per orthopedic surgery, Xarelto 10 mg once daily x12 days to start 6/8/2023  -SCDs while in bed  -Increase mobility    Consult Status:  Consult Order ID: [id number]     Attestation:   Note Completion:  I am a:  Advanced Practice Provider   Attending Only - Shared Visit with Advanced Practice Provider This is a shared visit.  I have reviewed the Advanced Practice Provider?s encounter note, approve the Advanced Practice Provider?s documentation,  and provide the following additional information from my personal encounter.    Comments/ Additional Findings    Dynamically stable.  No significant medical active issues at this time.  We will follow-up on blood work in the morning.          Electronic Signatures:  Shirin Stuart (APRN-CNP)  (Signed 07-Jun-2023 18:06)   Authored: Service, History of Present Illness, Past Medical/Surgical  History, Review Family/Social History and ROS, Allergies, Objective, Assessment/Recommendations, Note Completion  Etienne Ellington (DO)  (Signed 08-Jun-2023 07:09)   Authored: Note Completion   Co-Signer: Service, History of Present Illness, Past Medical/Surgical History, Review Family/Social History  and ROS, Allergies, Objective,  Assessment/Recommendations, Note Completion      Last Updated: 08-Jun-2023 07:09 by Etienne Ellington (DO)

## 2024-05-06 NOTE — OP NOTE
SURGEON:  César Siddiqi MD    PREOPERATIVE DIAGNOSIS:    Left knee arthrofibrosis.    POSTOPERATIVE DIAGNOSIS:    Left knee arthrofibrosis.    PROCEDURE:    Left knee manipulation.    ASSISTANT:    Rober Fallon.    ANESTHESIA:    General.    INDICATIONS:    A 58-year-old who underwent left total knee arthroplasty and has had  difficulty progressing his range of motion.  Treatment options  including no treatment were reviewed.  Decision was made to proceed  with manipulation.     DESCRIPTION OF PROCEDURE:    The patient was brought in the operating room after adductor block  was performed.  General anesthesia was performed.  Examination under  anesthesia, prior to manipulation had range of motion of 3 to 85  degrees.  Manipulation was performed and release of adhesions could  be felt and range of motion following manipulation was from 2-125   degrees.  He tolerated the procedure well and was taken to recovery  room in stable condition.       César Siddiqi MD    DD:  08/28/2023 07:51:09 EST  DT:  08/28/2023 10:51:36 EST  DICTATION NUMBER:  [id number]  INTERNAL JOB NUMBER:  [id number]             Electronic Signatures:  César Siddiqi) (Signed on 30-Aug-2023 10:42)   Authored  Unsigned, Draft (SYS GENERATED) (Entered on 28-Aug-2023 10:51)   Entered    Last Updated: 30-Aug-2023 10:42 by César Siddiqi)

## 2024-05-06 NOTE — OP NOTE
SURGEON:  César Siddiqi MD    PREOPERATIVE DIAGNOSIS:    Left knee severe degenerative joint disease.    POSTOPERATIVE DIAGNOSIS:    Left knee severe degenerative joint disease.    PROCEDURES:    Left total knee arthroplasty with the DePuy attune femur size 8,  tibia size 7, 5 mm polyethylene and a 38 mm patella.     ASSISTANTS:    1. Dilia Campbell.  2. Jassi Brown SA.    ANESTHESIA:    Spinal.    INDICATIONS:    A 58-year-old with advanced degenerative disease in the left knee.  He has been through extensive conservative treatment, but has  persistent severe debilitating pain.  Treatment options including no  treatment reviewed and the decision was made to proceed with surgery.   Radiographs demonstrate complete loss of joint space and subchondral  sclerosis and osteophyte formation.     Dilia Campbell, served as 1st assistant throughout the procedure.  Her  assistance was required for patient positioning and wound exposure  and cutting guide placement as well as trial implant and final  implantation closure.  No qualified resident was available.     DESCRIPTION OF PROCEDURE:    Adductor block was performed in the preoperative area.  The patient  was brought into the operating room.  Spinal anesthesia was  performed.  He was positioned and prepped and draped in the usual  fashion.  After Esmarch exsanguination, tourniquet was inflated.  A  longitudinal midline incision was made.  Medial parapatellar  arthrotomy was performed.  Medial periosteal release was done.  There  was advanced tricompartmental degenerative disease, most severe in  the medial compartment.  Remaining medial and lateral menisci  removed.  ACL was removed.  Notch osteophytes and peripheral  osteophytes removed.  Entry in the femoral canal was done with a  drill.  Intramedullary alignment guide was placed.  This was set on 5  degrees of valgus and set to resect 9 mm off the distal femur.  With  an oscillating saw, the distal femoral  cut was made.  The femur sized  to a size 8.  This was pinned in 3 degrees of external rotation.  Size 8 cutting block was applied and the anterior, posterior, and  chamfer cuts were made with an oscillating saw.  The sulcus guide was  placed in the sulcus cut was made.  Attention was turned to the  tibia.  External alignment guide was used.  This was set to resect 10  mm off the lateral side and 2 off the medial side.  With an  oscillating saw, the tibial cut was made.  Tibia sized to a size 7.  Posterior osteophytes were removed.  The PCL was released on the  tibial side and posterior capsule was stripped.  Trialing was then  done with a size 8 femur, size 7 tibia, 5 mm polyethylene came to  full extension, was stable throughout a full range of motion and did  not lift off or pull out at 90 degrees of flexion.  Decision was made  to resurface the patella and with an oscillating saw, the patellar  cut was made to remove a similar amount of bone as a patellar  implant.  Patellar lug drills were drilled.  The patella was trialed  and tracked well.  The femoral lug drills were drilled and the tibia  was prepared for the appropriate punches.  Sclerotic bone was drilled  with a small drill bit.  The bone was pulse irrigated and then well  dried.  The tibial implant was cemented first.  Polyethylene was  impacted on and then the femoral implant was cemented followed by the  tibial implant.  Extruding cement was removed.  The cement was  allowed to harden.  The wound was copiously with antibiotic  irrigation.  Hemostasis was carefully obtained.  The tourniquet had  been released.  The arthrotomy was closed with #1 Vicryl.  Flexion  against gravity was 120 degrees following closure.  Remainder of  wound was closed in layers.  Sterile dressing was applied.  He was  taken to recovery room in stable condition.     ESTIMATED BLOOD LOSS:    25 cc.    SPECIMEN:    The bone was sent for specimen.    COMPLICATIONS:    There were  no complications.    ANTIBIOTICS:    He received the appropriate preoperative antibiotic within 1 hour of  the skin incision and antibiotics were ordered postoperatively to be  completed within 24 hours.  DVT prophylaxis was ordered start within  24 hours.  He received 1 g of tranexamic acid at the beginning and at  the completion of the procedure.       César Siddiqi MD    DD:  06/07/2023 15:15:06 EST  DT:  06/07/2023 17:29:25 EST  DICTATION NUMBER:  [id number]  INTERNAL JOB NUMBER:  [id number]             Electronic Signatures:  César Siddiqi (MD) (Signed on 08-Jun-2023 07:33)   Authored  Unsigned, Draft (SYS GENERATED) (Entered on 07-Jun-2023 17:29)   Entered    Last Updated: 08-Jun-2023 07:33 by César Siddiqi)

## 2025-03-25 ENCOUNTER — OFFICE VISIT (OUTPATIENT)
Dept: URGENT CARE | Age: 60
End: 2025-03-25
Payer: COMMERCIAL

## 2025-03-25 VITALS
RESPIRATION RATE: 18 BRPM | HEART RATE: 85 BPM | OXYGEN SATURATION: 95 % | DIASTOLIC BLOOD PRESSURE: 72 MMHG | WEIGHT: 250 LBS | TEMPERATURE: 99.9 F | SYSTOLIC BLOOD PRESSURE: 112 MMHG | BODY MASS INDEX: 31.08 KG/M2 | HEIGHT: 75 IN

## 2025-03-25 DIAGNOSIS — R68.89 FLU-LIKE SYMPTOMS: ICD-10-CM

## 2025-03-25 DIAGNOSIS — J01.90 ACUTE SINUSITIS, RECURRENCE NOT SPECIFIED, UNSPECIFIED LOCATION: ICD-10-CM

## 2025-03-25 DIAGNOSIS — R11.2 NAUSEA AND VOMITING, UNSPECIFIED VOMITING TYPE: Primary | ICD-10-CM

## 2025-03-25 DIAGNOSIS — R05.1 ACUTE COUGH: ICD-10-CM

## 2025-03-25 LAB
POC CORONAVIRUS SARS-COV-2 PCR: NEGATIVE
POC HUMAN RHINOVIRUS PCR: NEGATIVE
POC INFLUENZA A VIRUS PCR: NEGATIVE
POC INFLUENZA B VIRUS PCR: NEGATIVE
POC RESPIRATORY SYNCYTIAL VIRUS PCR: NEGATIVE

## 2025-03-25 PROCEDURE — 3008F BODY MASS INDEX DOCD: CPT | Performed by: PHYSICIAN ASSISTANT

## 2025-03-25 PROCEDURE — 87631 RESP VIRUS 3-5 TARGETS: CPT | Performed by: PHYSICIAN ASSISTANT

## 2025-03-25 PROCEDURE — 99203 OFFICE O/P NEW LOW 30 MIN: CPT | Performed by: PHYSICIAN ASSISTANT

## 2025-03-25 PROCEDURE — 1036F TOBACCO NON-USER: CPT | Performed by: PHYSICIAN ASSISTANT

## 2025-03-25 RX ORDER — BENZONATATE 200 MG/1
200 CAPSULE ORAL 3 TIMES DAILY PRN
Qty: 30 CAPSULE | Refills: 0 | Status: SHIPPED | OUTPATIENT
Start: 2025-03-25

## 2025-03-25 RX ORDER — ONDANSETRON 4 MG/1
4 TABLET, ORALLY DISINTEGRATING ORAL EVERY 8 HOURS PRN
Qty: 10 TABLET | Refills: 0 | Status: SHIPPED | OUTPATIENT
Start: 2025-03-25

## 2025-03-25 ASSESSMENT — ENCOUNTER SYMPTOMS
CHILLS: 1
FEVER: 0
DIAPHORESIS: 0
ABDOMINAL PAIN: 0
SORE THROAT: 0
COUGH: 1
SHORTNESS OF BREATH: 0
NAUSEA: 1
VOMITING: 1
RHINORRHEA: 1

## 2025-03-25 NOTE — PROGRESS NOTES
"Subjective   Patient ID: Jak Garcia is a 59 y.o. male. They present today with a chief complaint of Vomiting (\"Everytime I cough my stomach hurts\" chills, cough, vomiting today and sx started Sunday evening).    History of Present Illness  Here with significant other;     Chills, nausea, vomiting (3x today, after eating romanburger, none yesterday), nasal congestion/rhinorrhea, productive cough (worse laying) started Sun. Night.     Denies fever, sweats, chest pain, SOB, ear pain, sore throat    Took theraflu.                 Past Medical History  Allergies as of 03/25/2025 - Reviewed 03/25/2025   Allergen Reaction Noted    Pollen extracts Itching 08/12/2013       (Not in a hospital admission)       History reviewed. No pertinent past medical history.    History reviewed. No pertinent surgical history.     reports that he has never smoked. He has never been exposed to tobacco smoke. He has never used smokeless tobacco. He reports that he does not drink alcohol and does not use drugs.    Review of Systems  Review of Systems   Constitutional:  Positive for chills. Negative for diaphoresis and fever.   HENT:  Positive for congestion and rhinorrhea. Negative for ear pain and sore throat.    Respiratory:  Positive for cough. Negative for shortness of breath.    Cardiovascular:  Negative for chest pain.   Gastrointestinal:  Positive for nausea and vomiting. Negative for abdominal pain.   All other systems reviewed and are negative.                                 Objective    Vitals:    03/25/25 1807   BP: 112/72   BP Location: Right arm   Patient Position: Sitting   BP Cuff Size: Large adult   Pulse: 85   Resp: 18   Temp: 37.7 °C (99.9 °F)   TempSrc: Oral   SpO2: 95%   Weight: 113 kg (250 lb)   Height: 1.905 m (6' 3\")     No LMP for male patient.    Physical Exam  Vitals and nursing note reviewed.   Constitutional:       General: He is not in acute distress.  HENT:      Right Ear: Tympanic membrane normal.      Left " Ear: Tympanic membrane normal.      Nose: Congestion and rhinorrhea present.      Mouth/Throat:      Pharynx: No oropharyngeal exudate or posterior oropharyngeal erythema.   Cardiovascular:      Rate and Rhythm: Normal rate and regular rhythm.      Heart sounds: Normal heart sounds.   Pulmonary:      Effort: Pulmonary effort is normal.      Breath sounds: Normal breath sounds.   Neurological:      Mental Status: He is alert.         Procedures    Point of Care Test & Imaging Results from this visit  No results found for this visit on 03/25/25.   No results found.    Diagnostic study results (if any) were reviewed by Gladys Fleming PA-C.    Assessment/Plan   Allergies, medications, history, and pertinent labs/EKGs/Imaging reviewed by Gladys Fleming PA-C.     Orders and Diagnoses  Diagnoses and all orders for this visit:  Flu-like symptoms  -     POCT SPOTFIRE R/ST Panel Mini w/COVID ([facility]) manually resulted      Medical Admin Record      Patient disposition: Home    Electronically signed by Gladys Fleming PA-C  6:18 PM

## 2025-03-25 NOTE — PATIENT INSTRUCTIONS
alternate ibuprofen, 800mg max (with food), and tylenol, 1000mg max, every 4 hours as needed for discomfort/fever    take antihistamine during the day, ex zyrtec, claritin, etc, for nasal congestion/runny nose.    use saline nasal spray, for nasal congestion/runny nose.    take decongestant at night ex, robbin seltzer cold pills, robitussin, delsym, etc, for nasal congestion/runny nose.    Take zofran for nausea.    follow up with primary care if no improvement in 3 - 4 days.    take benzonatate for cough.

## 2025-08-25 ENCOUNTER — APPOINTMENT (OUTPATIENT)
Dept: ORTHOPEDIC SURGERY | Facility: CLINIC | Age: 60
End: 2025-08-25
Payer: COMMERCIAL

## 2025-08-26 ENCOUNTER — TELEPHONE (OUTPATIENT)
Dept: ORTHOPEDIC SURGERY | Facility: CLINIC | Age: 60
End: 2025-08-26
Payer: COMMERCIAL

## 2025-08-27 ENCOUNTER — APPOINTMENT (OUTPATIENT)
Dept: ORTHOPEDIC SURGERY | Facility: CLINIC | Age: 60
End: 2025-08-27
Payer: COMMERCIAL

## 2025-08-29 PROBLEM — M17.0 DEGENERATIVE ARTHRITIS OF KNEE, BILATERAL: Status: ACTIVE | Noted: 2025-08-29

## 2025-08-29 PROBLEM — Z22.321 MSSA (METHICILLIN-SUSCEPTIBLE STAPH AUREUS) CARRIER: Status: ACTIVE | Noted: 2025-08-29

## 2025-08-29 PROBLEM — R26.2 DIFFICULTY IN WALKING INVOLVING JOINT: Status: ACTIVE | Noted: 2025-08-29

## 2025-08-29 PROBLEM — M25.662 DECREASED RANGE OF MOTION OF LEFT KNEE: Status: ACTIVE | Noted: 2025-08-29

## 2025-08-29 PROBLEM — Z96.652 HISTORY OF KNEE REPLACEMENT PROCEDURE OF LEFT KNEE: Status: ACTIVE | Noted: 2025-08-29

## 2025-08-29 PROBLEM — M25.462 KNEE EFFUSION, LEFT: Status: ACTIVE | Noted: 2025-08-29

## 2025-09-03 ENCOUNTER — HOSPITAL ENCOUNTER (OUTPATIENT)
Dept: RADIOLOGY | Facility: CLINIC | Age: 60
Discharge: HOME | End: 2025-09-03
Payer: COMMERCIAL

## 2025-09-03 ENCOUNTER — APPOINTMENT (OUTPATIENT)
Dept: PRIMARY CARE | Facility: CLINIC | Age: 60
End: 2025-09-03
Payer: COMMERCIAL

## 2025-09-03 DIAGNOSIS — M54.16 LUMBAR BACK PAIN WITH RADICULOPATHY AFFECTING LOWER EXTREMITY: ICD-10-CM

## 2025-09-03 PROCEDURE — 72110 X-RAY EXAM L-2 SPINE 4/>VWS: CPT | Performed by: RADIOLOGY

## 2025-09-03 PROCEDURE — 72110 X-RAY EXAM L-2 SPINE 4/>VWS: CPT

## 2025-09-03 ASSESSMENT — PATIENT HEALTH QUESTIONNAIRE - PHQ9
SUM OF ALL RESPONSES TO PHQ9 QUESTIONS 1 AND 2: 0
2. FEELING DOWN, DEPRESSED OR HOPELESS: NOT AT ALL
1. LITTLE INTEREST OR PLEASURE IN DOING THINGS: NOT AT ALL

## 2025-09-03 ASSESSMENT — ENCOUNTER SYMPTOMS
OCCASIONAL FEELINGS OF UNSTEADINESS: 0
LOSS OF SENSATION IN FEET: 0
DEPRESSION: 0